# Patient Record
Sex: FEMALE | Race: WHITE | URBAN - METROPOLITAN AREA
[De-identification: names, ages, dates, MRNs, and addresses within clinical notes are randomized per-mention and may not be internally consistent; named-entity substitution may affect disease eponyms.]

---

## 2021-06-10 ENCOUNTER — OFFICE VISIT (OUTPATIENT)
Dept: URBAN - METROPOLITAN AREA CLINIC 78 | Facility: CLINIC | Age: 66
End: 2021-06-10
Payer: MEDICARE

## 2021-06-10 DIAGNOSIS — R12 HEARTBURN: ICD-10-CM

## 2021-06-10 DIAGNOSIS — N18.9 CHRONIC KIDNEY DISEASE, UNSPECIFIED CKD STAGE: ICD-10-CM

## 2021-06-10 DIAGNOSIS — Z86.010 PERSONAL HISTORY OF COLONIC POLYPS: ICD-10-CM

## 2021-06-10 DIAGNOSIS — R74.8 ELEVATED ALKALINE PHOSPHATASE LEVEL: ICD-10-CM

## 2021-06-10 DIAGNOSIS — K59.01 CONSTIPATION: ICD-10-CM

## 2021-06-10 DIAGNOSIS — I48.91 UNSPECIFIED ATRIAL FIBRILLATION: ICD-10-CM

## 2021-06-10 DIAGNOSIS — I25.10 CORONARY ARTERY DISEASE INVOLVING NATIVE HEART, UNSPECIFIED VESSEL OR LESION TYPE, UNSPECIFIED WHETHER ANGINA PRESENT: ICD-10-CM

## 2021-06-10 DIAGNOSIS — D64.9 ANEMIA, UNSPECIFIED TYPE: ICD-10-CM

## 2021-06-10 PROCEDURE — 99214 OFFICE O/P EST MOD 30 MIN: CPT | Performed by: INTERNAL MEDICINE

## 2021-06-10 RX ORDER — ROSUVASTATIN CALCIUM 40 MG/1
1 TABLET TABLET, FILM COATED ORAL ONCE A DAY
Status: ACTIVE | COMMUNITY

## 2021-06-10 RX ORDER — ESOMEPRAZOLE MAGNESIUM 40 MG/1
1 CAPSULE 30 MINUTES BEFORE BREAKFAST AND 30 MINUTES BEFORE DINNER CAPSULE, DELAYED RELEASE ORAL BID
Qty: 60 | Refills: 2 | OUTPATIENT
Start: 2021-06-10

## 2021-06-10 NOTE — HPI-TODAY'S VISIT:
I last saw the patient in 2019 on referral from Yesenia Palma DO , for a gastroenterology evaluation for anemia and gastritis noted on EGD on 7/29/19.  A copy of this note will be sent to the referring physician.    The patient has a very complex medical history. She was admitted in 2019 to [place] ([facility] and subsequently transferred to [facility]) for L heart cath s/p 5 PAUL PTCA.  After the procedure, she developed severe chest pain, and CTA revealed a Type I aortic dissection. She was taken to the OR on 6/24 for repair of aortic dissection and CABG x1 (SVG-RCA). Back to OR on 6/24 for washout/re-exploration and 6/28 for chest closure. She has a history of CAD, HTN, HLD, CVA, cerebral aneurysm, seizure d/o and hypothyroidism. Her presentation and procedures were complicated by a prolonged hospital stay/re-admission requiring: (a) 2 subsequent surgeries  (b) aspiration PNA (c) symptomatic anemia, with a ck Hb of 6.8 in August 2019 (d) gastritis noted on EGD 7/29, H pylori negative (e) HD 2/2 [facility]  (f) Multifactorial metabolic encephalopathy; CT head on 8/2 has no acute intracranial abnormality w/ prior lacunar infarcts and acute R maxillary sinusitis. (g) cardiogenic shock which resolved (EF 6/24 65%) (h) Afib/flutter which resolved (i) Mild elevation of Alk Phos to 113. Patient states that this has been chronically elevated.  In 2019 I had been following her for anemia (on 11/8/19, her Hb was 9.2, and a few weeks later 7.8). EGD on 7/29/19 showed mild gastritis. No H pylori. She last had a colonoscopy in 2018 at outside facility. Reviewed outside colonoscopy/pathology report done in 11/18 which showed 2 small TAs.   She received Fe infusions a couple of years ago and since then her Hb has remained relatively stable. She recalls her last Hb was 11.   She has noticed increased food  GERD lately, both during the morning hours and at night. She has not had any nausea or vomiting. No melena or hematochezia. No hematemesis.   She had been on Protonix but she did not feel this was helping with her heartburn. Her PCP subsequently switched her to Nexium 40mg QHS. This was not helpful either. She has noticed certain food triggers, in particular coffee (she only drinks one cup a day). She has no associated hoarseness or throat clearing. No dysphagia or odynophagia.    There is no recent history of rectal bleeding, abdominal pain, constipation, diarrhea, bloating, rectal pain, anorexia or unintentional weight loss.   She is now only on a baby ASA. Dr. Mauro has kept her off Plavix and Coumadin.   She did require HD briefly. She is off HD now. She continues to follow with nephrologist routinely.   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  Summary of prior workup:  - 2D ECHO on 5/21 revealed a normal left ventricular chamber and wall with a 55-60% ejection fraction.  Normal systolic function.  Normal left atrial pressure and diastolic function.  RV size normal.  No shunting seen.  Trileaflet aortic valve with trivial aortic regurgitation and no aortic valve stenosis.  Tricuspid valve structurally normal with moderate to severe tricuspid regurgitation.  Right atrial pressure is 8 mm - Bloodwork on 12/6/19: Hb 7.8, Plts 373. WBC 6.4.  - Labs on 11/8/19: Hb 9.5 - EGD on 7/29/19 showed mild gastritis. No H pylori.  - MRI brain with subacute punctate right precentral gyrus infarct. Dizziness resolved prior to discharge and thought to be related to Trileptal - Colonoscopy by Dr. Edge on 11/9/18 revealed two sessile TA's in the descending colon measuring 5-6 mm in diameter as well as mild splenic flexure and descending colon diverticulosis. The remainder of the exam apparently was normal. The quality of the prep was good.

## 2021-06-11 ENCOUNTER — TELEPHONE ENCOUNTER (OUTPATIENT)
Dept: URBAN - METROPOLITAN AREA CLINIC 78 | Facility: CLINIC | Age: 66
End: 2021-06-11

## 2021-06-16 ENCOUNTER — OFFICE VISIT (OUTPATIENT)
Dept: URBAN - METROPOLITAN AREA SURGERY CENTER 15 | Facility: SURGERY CENTER | Age: 66
End: 2021-06-16

## 2021-07-13 ENCOUNTER — TELEPHONE ENCOUNTER (OUTPATIENT)
Dept: URBAN - METROPOLITAN AREA CLINIC 78 | Facility: CLINIC | Age: 66
End: 2021-07-13

## 2021-07-16 ENCOUNTER — ERX REFILL RESPONSE (OUTPATIENT)
Dept: URBAN - METROPOLITAN AREA CLINIC 78 | Facility: CLINIC | Age: 66
End: 2021-07-16

## 2021-07-16 RX ORDER — ESOMEPRAZOLE MAGNESIUM 40 MG/1
1 CAPSULE 30 MINUTES BEFORE BREAKFAST AND 30 MINUTES BEFORE DINNER CAPSULE, DELAYED RELEASE ORAL BID
Qty: 60 | Refills: 2 | OUTPATIENT

## 2021-07-16 RX ORDER — ESOMEPRAZOLE MAGNESIUM 40 MG/1
TAKE 1 CAPSULE BY MOUTH TWICE A DAY 30 MINS BEFORE BREAKFAST AND DINNER CAPSULE, DELAYED RELEASE ORAL
Qty: 60 CAPSULE | Refills: 3 | OUTPATIENT

## 2021-07-21 ENCOUNTER — TELEPHONE ENCOUNTER (OUTPATIENT)
Dept: URBAN - METROPOLITAN AREA CLINIC 78 | Facility: CLINIC | Age: 66
End: 2021-07-21

## 2021-07-21 ENCOUNTER — OFFICE VISIT (OUTPATIENT)
Dept: URBAN - METROPOLITAN AREA SURGERY CENTER 15 | Facility: SURGERY CENTER | Age: 66
End: 2021-07-21
Payer: MEDICARE

## 2021-07-21 ENCOUNTER — CLAIMS CREATED FROM THE CLAIM WINDOW (OUTPATIENT)
Dept: URBAN - METROPOLITAN AREA CLINIC 4 | Facility: CLINIC | Age: 66
End: 2021-07-21
Payer: MEDICARE

## 2021-07-21 DIAGNOSIS — K29.81 DUODENITIS WITH BLEEDING: ICD-10-CM

## 2021-07-21 DIAGNOSIS — K31.89 GASTRIC FOVEOLAR HYPERPLASIA: ICD-10-CM

## 2021-07-21 DIAGNOSIS — K22.8 OTHER SPECIFIED DISEASES OF ESOPHAGUS: ICD-10-CM

## 2021-07-21 DIAGNOSIS — K21.9 GASTRO-ESOPHAGEAL REFLUX DISEASE WITHOUT ESOPHAGITIS: ICD-10-CM

## 2021-07-21 DIAGNOSIS — K21.9 ACID REFLUX: ICD-10-CM

## 2021-07-21 DIAGNOSIS — K29.80 ACUTE DUODENITIS: ICD-10-CM

## 2021-07-21 PROCEDURE — G8907 PT DOC NO EVENTS ON DISCHARG: HCPCS | Performed by: INTERNAL MEDICINE

## 2021-07-21 PROCEDURE — 43239 EGD BIOPSY SINGLE/MULTIPLE: CPT | Performed by: INTERNAL MEDICINE

## 2021-07-21 PROCEDURE — 88305 TISSUE EXAM BY PATHOLOGIST: CPT | Performed by: PATHOLOGY

## 2021-07-21 PROCEDURE — 88312 SPECIAL STAINS GROUP 1: CPT | Performed by: PATHOLOGY

## 2021-07-21 RX ORDER — PANTOPRAZOLE SODIUM 40 MG/1
1 TABLET- 30 MINS BEFORE AND 30 MINS BEFORE DINNER TABLET, DELAYED RELEASE ORAL TWICE A DAY
Qty: 60 | Refills: 2 | OUTPATIENT
Start: 2021-07-21

## 2021-07-21 RX ORDER — ROSUVASTATIN CALCIUM 40 MG/1
1 TABLET TABLET, FILM COATED ORAL ONCE A DAY
Status: ACTIVE | COMMUNITY

## 2021-07-21 RX ORDER — ESOMEPRAZOLE MAGNESIUM 40 MG/1
TAKE 1 CAPSULE BY MOUTH TWICE A DAY 30 MINS BEFORE BREAKFAST AND DINNER CAPSULE, DELAYED RELEASE ORAL
Qty: 60 CAPSULE | Refills: 3 | Status: ACTIVE | COMMUNITY

## 2021-08-13 ENCOUNTER — ERX REFILL RESPONSE (OUTPATIENT)
Dept: URBAN - METROPOLITAN AREA CLINIC 78 | Facility: CLINIC | Age: 66
End: 2021-08-13

## 2021-08-13 RX ORDER — PANTOPRAZOLE SODIUM 40 MG/1
1 TABLET- 30 MINS BEFORE AND 30 MINS BEFORE DINNER ORALLY TWICE A DAY 30 DAY(S) TABLET, DELAYED RELEASE ORAL
Qty: 60 TABLET | Refills: 3 | OUTPATIENT

## 2021-08-13 RX ORDER — PANTOPRAZOLE SODIUM 40 MG/1
1 TABLET- 30 MINS BEFORE AND 30 MINS BEFORE DINNER TABLET, DELAYED RELEASE ORAL TWICE A DAY
Qty: 60 | Refills: 2 | OUTPATIENT

## 2022-01-11 ENCOUNTER — OFFICE VISIT (OUTPATIENT)
Dept: URBAN - METROPOLITAN AREA CLINIC 78 | Facility: CLINIC | Age: 67
End: 2022-01-11
Payer: MEDICARE

## 2022-01-11 VITALS
HEIGHT: 63 IN | BODY MASS INDEX: 25.34 KG/M2 | TEMPERATURE: 96.8 F | DIASTOLIC BLOOD PRESSURE: 77 MMHG | WEIGHT: 143 LBS | SYSTOLIC BLOOD PRESSURE: 175 MMHG | HEART RATE: 87 BPM

## 2022-01-11 DIAGNOSIS — Z86.010 PERSONAL HISTORY OF COLONIC POLYPS: ICD-10-CM

## 2022-01-11 DIAGNOSIS — K59.01 CONSTIPATION: ICD-10-CM

## 2022-01-11 DIAGNOSIS — I25.10 CORONARY ARTERY DISEASE INVOLVING NATIVE HEART, UNSPECIFIED VESSEL OR LESION TYPE, UNSPECIFIED WHETHER ANGINA PRESENT: ICD-10-CM

## 2022-01-11 DIAGNOSIS — R74.8 ELEVATED ALKALINE PHOSPHATASE LEVEL: ICD-10-CM

## 2022-01-11 DIAGNOSIS — D64.9 ANEMIA, UNSPECIFIED TYPE: ICD-10-CM

## 2022-01-11 DIAGNOSIS — R12 HEARTBURN: ICD-10-CM

## 2022-01-11 DIAGNOSIS — I48.91 UNSPECIFIED ATRIAL FIBRILLATION: ICD-10-CM

## 2022-01-11 DIAGNOSIS — N18.9 CHRONIC KIDNEY DISEASE, UNSPECIFIED CKD STAGE: ICD-10-CM

## 2022-01-11 PROCEDURE — 99214 OFFICE O/P EST MOD 30 MIN: CPT | Performed by: INTERNAL MEDICINE

## 2022-01-11 RX ORDER — ESOMEPRAZOLE MAGNESIUM 40 MG/1
1 CAPSULE 30 MINUTES BEFORE BREAKFAST AND 30 MINUTES BEFORE DINNER CAPSULE, DELAYED RELEASE ORAL BID
Qty: 60 | Refills: 2 | OUTPATIENT

## 2022-01-11 RX ORDER — ROSUVASTATIN CALCIUM 40 MG/1
1 TABLET TABLET, FILM COATED ORAL ONCE A DAY
Status: ACTIVE | COMMUNITY

## 2022-01-11 RX ORDER — ESOMEPRAZOLE MAGNESIUM 40 MG/1
TAKE 1 CAPSULE BY MOUTH TWICE A DAY 30 MINS BEFORE BREAKFAST AND DINNER CAPSULE, DELAYED RELEASE ORAL
Qty: 60 CAPSULE | Refills: 3 | Status: ACTIVE | COMMUNITY

## 2022-01-11 RX ORDER — PANTOPRAZOLE SODIUM 40 MG/1
1 TABLET- 30 MINS BEFORE AND 30 MINS BEFORE DINNER ORALLY TWICE A DAY 30 DAY(S) TABLET, DELAYED RELEASE ORAL
Qty: 60 TABLET | Refills: 3 | Status: ACTIVE | COMMUNITY

## 2022-01-11 NOTE — HPI-TODAY'S VISIT:
I last saw the patient in 2019 on referral from Yesenia Palma DO , for a gastroenterology evaluation for anemia and gastritis noted on EGD on 7/29/19.  A copy of this note will be sent to the referring physician.    The patient has a very complex medical history. She was admitted in 2019 to [place] ([facility] and subsequently transferred to [facility]) for L heart cath s/p 5 PAUL PTCA.  After the procedure, she developed severe chest pain, and CTA revealed a Type I aortic dissection. She was taken to the OR on 6/24 for repair of aortic dissection and CABG x1 (SVG-RCA). Back to OR on 6/24 for washout/re-exploration and 6/28 for chest closure. She has a history of CAD, HTN, HLD, CVA, cerebral aneurysm, seizure d/o and hypothyroidism. Her presentation and procedures were complicated by a prolonged hospital stay/re-admission requiring: (a) 2 subsequent surgeries  (b) aspiration PNA (c) symptomatic anemia, with a ck Hb of 6.8 in August 2019 (d) gastritis noted on EGD 7/29, H pylori negative (e) HD 2/2 [facility]  (f) Multifactorial metabolic encephalopathy; CT head on 8/2 has no acute intracranial abnormality w/ prior lacunar infarcts and acute R maxillary sinusitis. (g) cardiogenic shock which resolved (EF 6/24 65%) (h) Afib/flutter which resolved (i) Mild elevation of Alk Phos to 113. Patient states that this has been chronically elevated.  In 2019 I had been following her for anemia (on 11/8/19, her Hb was 9.2, and a few weeks later 7.8). EGD on 7/29/19 showed mild gastritis. No H pylori. She last had a colonoscopy in 2018 at outside facility. Reviewed outside colonoscopy/pathology report done in 11/18 which showed 2 small TAs.   She received Fe infusions a couple of years ago and since then her Hb has remained relatively stable. She recalls her last Hb was 11.   She is here today as she was recently noted to have a Hb of 7.9. She denies any recent melena or hematochezia. No nausea, vomiting or abodminal pain. She was infused 2 u iron. Her Hb is now  in the 11 range. She denies constipation, diarrhea, bloating, anorexia or unintentional weight loss.   She has no associated hoarseness or throat clearing. No dysphagia or odynophagia.    Although her numbers were better, she was still having shortness or breath and palpitations. In light of the above symptoms she went to see Dr. Zunilda arciniega.She is having a stress ECHO next week.   I had switched her to Protonix but she did not fele it was helping enough. She is now taking Nexium OTC 40mg BID. She is still having some reflux here and there at this time.    She is now only on a baby ASA. Dr. Mauro started her back on Plavix in the summer 2021. She is not on Coumadin.   She did require HD briefly. She is off HD now. She continues to follow with nephrologist routinely. Her last Cr was 1.4.   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  She is accompanied by her sister today.   Summary of prior workup:  - EGD by me on 7/21/2021: Normal esophagus, antral striped erythema with a few gastric erosions in the fundus.  Duodenal bulb patchy erythema.  Normal second portion of the duodenum.  Biopsies were negative for H. pylori or celiac sprue.  Roxanol esophageal biopsies were unremarkable.  Distal esophageal biopsies were consistent with reflux type changes, - 2D ECHO on 5/21 revealed a normal left ventricular chamber and wall with a 55-60% ejection fraction.  Normal systolic function.  Normal left atrial pressure and diastolic function.  RV size normal.  No shunting seen.  Trileaflet aortic valve with trivial aortic regurgitation and no aortic valve stenosis.  Tricuspid valve structurally normal with moderate to severe tricuspid regurgitation.  Right atrial pressure is 8 mm - Bloodwork on 12/6/19: Hb 7.8, Plts 373. WBC 6.4.  - Labs on 11/8/19: Hb 9.5 - EGD on 7/29/19 showed mild gastritis. No H pylori.  - MRI brain with subacute punctate right precentral gyrus infarct. Dizziness resolved prior to discharge and thought to be related to Trileptal - Colonoscopy by Dr. Edge on 11/9/18 revealed two sessile TA's in the descending colon measuring 5-6 mm in diameter as well as mild splenic flexure and descending colon diverticulosis. The remainder of the exam apparently was normal. The quality of the prep was good.

## 2022-02-08 ENCOUNTER — OFFICE VISIT (OUTPATIENT)
Dept: URBAN - METROPOLITAN AREA MEDICAL CENTER 10 | Facility: MEDICAL CENTER | Age: 67
End: 2022-02-08
Payer: MEDICARE

## 2022-02-08 DIAGNOSIS — K22.89 ESOPHAGEAL BLEEDING: ICD-10-CM

## 2022-02-08 DIAGNOSIS — K29.60 ADENOPAPILLOMATOSIS GASTRICA: ICD-10-CM

## 2022-02-08 DIAGNOSIS — D50.9 ANEMIA: ICD-10-CM

## 2022-02-08 DIAGNOSIS — K62.1 ANAL AND RECTAL POLYP: ICD-10-CM

## 2022-02-08 PROCEDURE — 45385 COLONOSCOPY W/LESION REMOVAL: CPT | Performed by: INTERNAL MEDICINE

## 2022-02-08 PROCEDURE — 43239 EGD BIOPSY SINGLE/MULTIPLE: CPT | Performed by: INTERNAL MEDICINE

## 2022-02-08 RX ORDER — ESOMEPRAZOLE MAGNESIUM 40 MG/1
TAKE 1 CAPSULE BY MOUTH TWICE A DAY 30 MINS BEFORE BREAKFAST AND DINNER CAPSULE, DELAYED RELEASE ORAL
Qty: 60 CAPSULE | Refills: 3 | Status: ACTIVE | COMMUNITY

## 2022-02-08 RX ORDER — ESOMEPRAZOLE MAGNESIUM 40 MG/1
1 CAPSULE 30 MINUTES BEFORE BREAKFAST AND 30 MINUTES BEFORE DINNER CAPSULE, DELAYED RELEASE ORAL BID
Qty: 60 | Refills: 2 | Status: ACTIVE | COMMUNITY

## 2022-02-08 RX ORDER — ROSUVASTATIN CALCIUM 40 MG/1
1 TABLET TABLET, FILM COATED ORAL ONCE A DAY
Status: ACTIVE | COMMUNITY

## 2022-02-08 RX ORDER — PANTOPRAZOLE SODIUM 40 MG/1
1 TABLET- 30 MINS BEFORE AND 30 MINS BEFORE DINNER ORALLY TWICE A DAY 30 DAY(S) TABLET, DELAYED RELEASE ORAL
Qty: 60 TABLET | Refills: 3 | Status: ACTIVE | COMMUNITY

## 2022-03-24 ENCOUNTER — OFFICE VISIT (OUTPATIENT)
Dept: URBAN - METROPOLITAN AREA CLINIC 78 | Facility: CLINIC | Age: 67
End: 2022-03-24
Payer: MEDICARE

## 2022-03-24 ENCOUNTER — DASHBOARD ENCOUNTERS (OUTPATIENT)
Age: 67
End: 2022-03-24

## 2022-03-24 VITALS
SYSTOLIC BLOOD PRESSURE: 130 MMHG | BODY MASS INDEX: 25.62 KG/M2 | TEMPERATURE: 97.5 F | WEIGHT: 144.6 LBS | DIASTOLIC BLOOD PRESSURE: 70 MMHG | HEIGHT: 63 IN | HEART RATE: 54 BPM

## 2022-03-24 DIAGNOSIS — R12 HEARTBURN: ICD-10-CM

## 2022-03-24 DIAGNOSIS — Z86.010 PERSONAL HISTORY OF COLONIC POLYPS: ICD-10-CM

## 2022-03-24 DIAGNOSIS — D64.9 ANEMIA, UNSPECIFIED TYPE: ICD-10-CM

## 2022-03-24 DIAGNOSIS — K59.01 CONSTIPATION: ICD-10-CM

## 2022-03-24 DIAGNOSIS — R74.8 ELEVATED ALKALINE PHOSPHATASE LEVEL: ICD-10-CM

## 2022-03-24 DIAGNOSIS — I48.91 UNSPECIFIED ATRIAL FIBRILLATION: ICD-10-CM

## 2022-03-24 DIAGNOSIS — N18.9 CHRONIC KIDNEY DISEASE, UNSPECIFIED CKD STAGE: ICD-10-CM

## 2022-03-24 DIAGNOSIS — I25.10 CORONARY ARTERY DISEASE INVOLVING NATIVE HEART, UNSPECIFIED VESSEL OR LESION TYPE, UNSPECIFIED WHETHER ANGINA PRESENT: ICD-10-CM

## 2022-03-24 DIAGNOSIS — T17.308D CHOKING, SUBSEQUENT ENCOUNTER: ICD-10-CM

## 2022-03-24 PROBLEM — 49436004 ATRIAL FIBRILLATION: Status: ACTIVE | Noted: 2021-06-10

## 2022-03-24 PROBLEM — 14760008 CONSTIPATION: Status: ACTIVE | Noted: 2021-06-10

## 2022-03-24 PROBLEM — 90688005 CHRONIC RENAL FAILURE SYNDROME: Status: ACTIVE | Noted: 2021-06-10

## 2022-03-24 PROBLEM — 428283002: Status: ACTIVE | Noted: 2021-06-10

## 2022-03-24 PROBLEM — 16331000 HEARTBURN: Status: ACTIVE | Noted: 2021-06-15

## 2022-03-24 PROBLEM — 451041000124103 ATHEROSCLEROTIC HEART DISEASE OF NATIVE CORONARY ARTERY WITHOUT ANGINA PECTORIS: Status: ACTIVE | Noted: 2022-01-10

## 2022-03-24 PROCEDURE — 99214 OFFICE O/P EST MOD 30 MIN: CPT | Performed by: INTERNAL MEDICINE

## 2022-03-24 RX ORDER — ESOMEPRAZOLE MAGNESIUM 40 MG/1
TAKE 1 CAPSULE BY MOUTH TWICE A DAY 30 MINS BEFORE BREAKFAST AND DINNER CAPSULE, DELAYED RELEASE ORAL
Qty: 60 CAPSULE | Refills: 3 | Status: ACTIVE | COMMUNITY

## 2022-03-24 RX ORDER — ESOMEPRAZOLE MAGNESIUM 20 MG/1
1 CAPSULE CAPSULE, DELAYED RELEASE ORAL
Qty: 60 CAPSULE | Refills: 4 | OUTPATIENT
Start: 2022-03-24

## 2022-03-24 RX ORDER — PANTOPRAZOLE SODIUM 40 MG/1
1 TABLET- 30 MINS BEFORE AND 30 MINS BEFORE DINNER ORALLY TWICE A DAY 30 DAY(S) TABLET, DELAYED RELEASE ORAL
Qty: 60 TABLET | Refills: 3 | Status: ACTIVE | COMMUNITY

## 2022-03-24 RX ORDER — ESOMEPRAZOLE MAGNESIUM 40 MG/1
1 CAPSULE 30 MINUTES BEFORE BREAKFAST AND 30 MINUTES BEFORE DINNER CAPSULE, DELAYED RELEASE ORAL BID
Qty: 60 | Refills: 2 | Status: ACTIVE | COMMUNITY

## 2022-03-24 RX ORDER — ROSUVASTATIN CALCIUM 40 MG/1
1 TABLET TABLET, FILM COATED ORAL ONCE A DAY
Status: ACTIVE | COMMUNITY

## 2022-03-24 NOTE — HPI-TODAY'S VISIT:
I last saw the patient in 2019 on referral from Yesenia Palam DO , for a gastroenterology evaluation for anemia and gastritis noted on EGD on 7/29/19.  A copy of this note will be sent to the referring physician.    The patient has a very complex medical history. She was admitted in 2019 to [place] ([facility] and subsequently transferred to [facility]) for L heart cath s/p 5 PAUL PTCA.  After the procedure, she developed severe chest pain, and CTA revealed a Type I aortic dissection. She was taken to the OR on 6/24 for repair of aortic dissection and CABG x1 (SVG-RCA). Back to OR on 6/24 for washout/re-exploration and 6/28 for chest closure. She has a history of CAD, HTN, HLD, CVA, cerebral aneurysm, seizure d/o and hypothyroidism. Her presentation and procedures were complicated by a prolonged hospital stay/re-admission requiring: (a) 2 subsequent surgeries  (b) aspiration PNA (c) symptomatic anemia, with a ck Hb of 6.8 in August 2019 (d) gastritis noted on EGD 7/29, H pylori negative (e) HD 2/2 [facility]  (f) Multifactorial metabolic encephalopathy; CT head on 8/2 has no acute intracranial abnormality w/ prior lacunar infarcts and acute R maxillary sinusitis. (g) cardiogenic shock which resolved (EF 6/24 65%) (h) Afib/flutter which resolved (i) Mild elevation of Alk Phos to 113. Patient states that this has been chronically elevated.  In 2019 I had been following her for anemia (on 11/8/19, her Hb was 9.2, and a few weeks later 7.8). EGD on 7/29/19 showed mild gastritis. No H pylori. She last had a colonoscopy in 2018 at outside facility. Reviewed outside colonoscopy/pathology report done in 11/18 which showed 2 small TAs.   She received Fe infusions a couple of years ago and since then her Hb has remained relatively stable. She recalls her last Hb was 11.   She was recently noted to have a Hb of 7.9. She denies any recent melena or hematochezia. No nausea, vomiting or abodminal pain. She was infused 2 u iron. She denies constipation, diarrhea, bloating, anorexia or unintentional weight loss.   She has no associated hoarseness or throat clearing. No dysphagia or odynophagia.    I had switched her to Protonix but she did not fele it was helping enough. She is now taking Nexium OTC 40mg BID. She is still having some reflux here and there at this time.   She is now only on a baby ASA. Dr. Mauro started her back on Plavix in the summer 2021. She is not on Coumadin.   She did require HD briefly. She is off HD now. She continues to follow with nephrologist routinely. Her last Cr was 1.4.   She went to the Hematologist today. She was told her Hb was 11.6. She is still awaiting the results of the Fe studies.  She aspirated a pea into her lung. A couple of days later she coughed it up.  The patient used to work as a nurse at 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 She is accompanied by her sister today.   Summary of prior workup:  - E/C by me on 2/8/22: Biopsies were negative for H. pylori infection.  PPI related polyp. Small bowel biopsies were unremarkable.  Mildly increased intraepithelial lymphocytes in the upper esoph, unremarkable lower esoph. HP rectal polyp. Recommend a repeat colonoscopy in 5 years.   - EGD by me on 7/21/2021: Normal esophagus, antral striped erythema with a few gastric erosions in the fundus.  Duodenal bulb patchy erythema.  Normal second portion of the duodenum.  Biopsies were negative for H. pylori or celiac sprue.  Roxanol esophageal biopsies were unremarkable.  Distal esophageal biopsies were consistent with reflux type changes, - 2D ECHO on 5/21 revealed a normal left ventricular chamber and wall with a 55-60% ejection fraction.  Normal systolic function.  Normal left atrial pressure and diastolic function.  RV size normal.  No shunting seen.  Trileaflet aortic valve with trivial aortic regurgitation and no aortic valve stenosis.  Tricuspid valve structurally normal with moderate to severe tricuspid regurgitation.  Right atrial pressure is 8 mm - Bloodwork on 12/6/19: Hb 7.8, Plts 373. WBC 6.4.  - Labs on 11/8/19: Hb 9.5 - EGD on 7/29/19 showed mild gastritis. No H pylori.  - MRI brain with subacute punctate right precentral gyrus infarct. Dizziness resolved prior to discharge and thought to be related to Trileptal - Colonoscopy by Dr. Edge on 11/9/18 revealed two sessile TA's in the descending colon measuring 5-6 mm in diameter as well as mild splenic flexure and descending colon diverticulosis. The remainder of the exam apparently was normal. The quality of the prep was good.